# Patient Record
Sex: FEMALE | Race: WHITE | NOT HISPANIC OR LATINO | Employment: FULL TIME | URBAN - METROPOLITAN AREA
[De-identification: names, ages, dates, MRNs, and addresses within clinical notes are randomized per-mention and may not be internally consistent; named-entity substitution may affect disease eponyms.]

---

## 2018-04-18 ENCOUNTER — TRANSCRIBE ORDERS (OUTPATIENT)
Dept: ADMINISTRATIVE | Facility: HOSPITAL | Age: 43
End: 2018-04-18

## 2018-04-18 DIAGNOSIS — E34.0 CARCINOID SYNDROME (HCC): Primary | ICD-10-CM

## 2018-04-25 ENCOUNTER — HOSPITAL ENCOUNTER (OUTPATIENT)
Dept: RADIOLOGY | Facility: HOSPITAL | Age: 43
Discharge: HOME | End: 2018-04-25
Payer: COMMERCIAL

## 2018-04-25 DIAGNOSIS — E34.0 CARCINOID SYNDROME (HCC): ICD-10-CM

## 2018-04-25 PROCEDURE — 74177 CT ABD & PELVIS W/CONTRAST: CPT

## 2018-04-25 PROCEDURE — 71260 CT THORAX DX C+: CPT

## 2018-04-25 RX ADMIN — IOHEXOL 100 ML: 350 INJECTION, SOLUTION INTRAVENOUS at 10:19

## 2019-08-01 ENCOUNTER — ANNUAL EXAM (OUTPATIENT)
Dept: OBGYN CLINIC | Facility: CLINIC | Age: 44
End: 2019-08-01
Payer: COMMERCIAL

## 2019-08-01 VITALS
WEIGHT: 166 LBS | DIASTOLIC BLOOD PRESSURE: 80 MMHG | SYSTOLIC BLOOD PRESSURE: 130 MMHG | BODY MASS INDEX: 26.68 KG/M2 | HEIGHT: 66 IN

## 2019-08-01 DIAGNOSIS — Z12.39 BREAST SCREENING, UNSPECIFIED: Primary | ICD-10-CM

## 2019-08-01 DIAGNOSIS — Z01.419 ENCOUNTER FOR ANNUAL ROUTINE GYNECOLOGICAL EXAMINATION: ICD-10-CM

## 2019-08-01 PROCEDURE — 99396 PREV VISIT EST AGE 40-64: CPT | Performed by: OBSTETRICS & GYNECOLOGY

## 2019-08-01 RX ORDER — AMITRIPTYLINE HYDROCHLORIDE 50 MG/1
50 TABLET, FILM COATED ORAL DAILY
Refills: 5 | COMMUNITY
Start: 2019-06-17

## 2019-08-01 RX ORDER — SUMATRIPTAN 50 MG/1
TABLET, FILM COATED ORAL
Refills: 5 | COMMUNITY
Start: 2019-07-19

## 2019-08-01 NOTE — PROGRESS NOTES
The patient is here for a yearly  The patient is not due for a pap smear  The patient has that come every 24-30 days  The patient sometime has periods twice in one month- she would have a period at the beginning of the month and towards the end of the month  No vaginal or urinary issues  No breast concerns

## 2019-08-01 NOTE — PROGRESS NOTES
Patient presents for an annual well visit  Her only complaint is that she has gained 20 lb a year for the last 2 years  She is followed by endocrine and has and all normal testing including thyroid  She has this continued flushing and has been thoroughly worked up with no diagnosis  I spent time going over her diet  She does not eat breakfast on a regular basis  I emphasized the importance of eating a high protein breakfast along with carbs in the form of fresh fruits  She seems to understand this concept and is willing to try  Gyn history   x1    x 1  Maternal grandmother developed ovarian cancer at age 79    Screening colonoscopy at age 38 was discussed  Medications were reviewed  Review of systems  No bowel bladder issues  No heart or chest issues  No pelvic pain regular periods  Physical exam   HEENT normal   Chest clear bilateral breath sounds  Heart regular rate no murmurs  Breasts no masses no skin changes no nipple discharge no axillary nodes  Abdomen soft nontender  Bowel sounds present  Extremities within normal limits  Pelvic examination  External genitalia normal   [place] glands normal   Urethra normal   Vagina clear no lesions  No cystocele rectocele uterine prolapse noted  Cervix no lesions  Uterus normal size mobile nontender  No adnexal masses or tenderness bilaterally  Rectal exam no fissures or external hemorrhoids  Impression normal breast and gyn exam     Plan Rx for mammogram given  Return to office in 1 year for annual exam   Observe menstrual periods  Consider colonoscopy in the next year or so

## 2020-06-17 ENCOUNTER — TRANSCRIBE ORDERS (OUTPATIENT)
Dept: ADMINISTRATIVE | Facility: HOSPITAL | Age: 45
End: 2020-06-17

## 2020-06-17 DIAGNOSIS — E04.9 ENLARGED THYROID: Primary | ICD-10-CM

## 2020-06-18 ENCOUNTER — HOSPITAL ENCOUNTER (OUTPATIENT)
Dept: RADIOLOGY | Facility: MEDICAL CENTER | Age: 45
Discharge: HOME | End: 2020-06-18
Payer: COMMERCIAL

## 2020-06-18 DIAGNOSIS — E04.9 ENLARGED THYROID: ICD-10-CM

## 2020-06-18 PROCEDURE — 76536 US EXAM OF HEAD AND NECK: CPT

## 2020-08-05 ENCOUNTER — ANNUAL EXAM (OUTPATIENT)
Dept: OBGYN CLINIC | Facility: CLINIC | Age: 45
End: 2020-08-05
Payer: COMMERCIAL

## 2020-08-05 VITALS
HEIGHT: 64 IN | BODY MASS INDEX: 27.83 KG/M2 | SYSTOLIC BLOOD PRESSURE: 122 MMHG | DIASTOLIC BLOOD PRESSURE: 80 MMHG | WEIGHT: 163 LBS

## 2020-08-05 DIAGNOSIS — Z01.419 ENCOUNTER FOR ANNUAL ROUTINE GYNECOLOGICAL EXAMINATION: ICD-10-CM

## 2020-08-05 DIAGNOSIS — Z12.31 ENCOUNTER FOR SCREENING MAMMOGRAM FOR BREAST CANCER: Primary | ICD-10-CM

## 2020-08-05 PROCEDURE — 99386 PREV VISIT NEW AGE 40-64: CPT | Performed by: OBSTETRICS & GYNECOLOGY

## 2020-08-05 NOTE — PROGRESS NOTES
The patient is here for a yearly  The patient is not due for a pap smear  Pap normal HPV neg 7/30/18, pap normal 9/10/16, pap normal 7/11/12  The patient has regular periods  She has her periods every 24-28 days  No vaginal, bowel, bladder or breast issues

## 2020-08-05 NOTE — PROGRESS NOTES
Assessment/Plan:  Normal breast and gyn exam    Plan:  Rx mammogram   Encouraged healthy diet and exercise  Add a multivitamin  Recommend flu vaccine    Subjective:      Patient ID: Mila Weaver is a 54 y  o  female presents for yearly exam with no complaints  Patient denies any breast problems, bowel or bladder issues  Menses are regular with no pelvic pain  No change in family history  Medications reviewed  Review of Systems   Constitutional: Negative  HENT: Negative  Eyes: Negative  Respiratory: Negative  Cardiovascular: Negative  Gastrointestinal: Negative  Endocrine: Negative  Musculoskeletal: Negative  Skin: Negative  Allergic/Immunologic: Negative  Neurological: Negative  Hematological: Negative  Psychiatric/Behavioral: Negative  All other systems reviewed and are negative  Objective:      /80 (BP Location: Left arm, Patient Position: Sitting, Cuff Size: Standard)   Ht 5' 4"   Wt 73 9 kg (163 lb)   LMP 2020 (Exact Date)   BMI 27 98 kg/m²          Physical Exam   Constitutional: She is oriented to person, place, and time  She appears well-developed  HENT:   Head: Normocephalic and atraumatic  Neck: Normal range of motion  Neck supple  No tracheal deviation present  No thyromegaly present  Cardiovascular: Normal rate, regular rhythm and normal heart sounds  Pulmonary/Chest: Effort normal and breath sounds normal  No stridor  No respiratory distress  She has no wheezes  She has no rales  She exhibits no tenderness  Right breast exhibits no inverted nipple, no mass, no nipple discharge, no skin change and no tenderness  Left breast exhibits no inverted nipple, no mass, no nipple discharge, no skin change and no tenderness  Breasts are symmetrical    Abdominal: Soft  Bowel sounds are normal  She exhibits no distension and no mass  There is no abdominal tenderness  There is no rebound and no guarding  No hernia   Hernia confirmed negative in the left inguinal area  Genitourinary:    Vagina normal    Rectum:      No rectal mass, anal fissure, external hemorrhoid or internal hemorrhoid  There is no rash, tenderness, lesion or injury on the right labia  There is no rash, tenderness, lesion or injury on the left labia  Uterus is not deviated, not enlarged, not fixed and not tender  Cervix exhibits no motion tenderness, no discharge and no friability  Right adnexum displays no mass, no tenderness and no fullness  Left adnexum displays no mass, no tenderness and no fullness  No vaginal discharge, erythema, tenderness or bleeding  No erythema, tenderness or bleeding in the vagina  No foreign body in the vagina  No signs of injury in the vagina  Lymphadenopathy: No inguinal adenopathy noted on the right or left side  Neurological: She is alert and oriented to person, place, and time  Skin: Skin is warm and dry     Psychiatric: Her behavior is normal  Judgment and thought content normal

## 2021-05-04 ENCOUNTER — TELEPHONE (OUTPATIENT)
Dept: OBGYN CLINIC | Facility: CLINIC | Age: 46
End: 2021-05-04

## 2021-05-04 NOTE — TELEPHONE ENCOUNTER
Pt called stating that her period was 3 days early one month than another month it was 32 days before she got it now she has her period and it is has been 14 days  It was heavy then went to spotting and now heavy again with clots  She wants to know could she be starting menopause and is there any way to tell this?

## 2023-08-28 ENCOUNTER — OFFICE VISIT (OUTPATIENT)
Dept: OBGYN CLINIC | Facility: CLINIC | Age: 48
End: 2023-08-28

## 2023-08-28 ENCOUNTER — APPOINTMENT (OUTPATIENT)
Dept: RADIOLOGY | Facility: AMBULARY SURGERY CENTER | Age: 48
End: 2023-08-28
Attending: ORTHOPAEDIC SURGERY
Payer: COMMERCIAL

## 2023-08-28 VITALS
HEART RATE: 74 BPM | WEIGHT: 163 LBS | HEIGHT: 64 IN | DIASTOLIC BLOOD PRESSURE: 84 MMHG | SYSTOLIC BLOOD PRESSURE: 136 MMHG | BODY MASS INDEX: 27.83 KG/M2

## 2023-08-28 DIAGNOSIS — M25.551 RIGHT HIP PAIN: ICD-10-CM

## 2023-08-28 DIAGNOSIS — M54.16 RIGHT LUMBAR RADICULITIS: ICD-10-CM

## 2023-08-28 DIAGNOSIS — M54.50 LOW BACK PAIN, UNSPECIFIED BACK PAIN LATERALITY, UNSPECIFIED CHRONICITY, UNSPECIFIED WHETHER SCIATICA PRESENT: ICD-10-CM

## 2023-08-28 DIAGNOSIS — M43.16 SPONDYLOLISTHESIS, LUMBAR REGION: ICD-10-CM

## 2023-08-28 DIAGNOSIS — M70.61 GREATER TROCHANTERIC BURSITIS OF RIGHT HIP: Primary | ICD-10-CM

## 2023-08-28 DIAGNOSIS — M47.816 FACET ARTHROPATHY, LUMBAR: ICD-10-CM

## 2023-08-28 PROCEDURE — 73502 X-RAY EXAM HIP UNI 2-3 VIEWS: CPT

## 2023-08-28 PROCEDURE — 72110 X-RAY EXAM L-2 SPINE 4/>VWS: CPT

## 2023-08-28 RX ORDER — BETAMETHASONE SODIUM PHOSPHATE AND BETAMETHASONE ACETATE 3; 3 MG/ML; MG/ML
12 INJECTION, SUSPENSION INTRA-ARTICULAR; INTRALESIONAL; INTRAMUSCULAR; SOFT TISSUE
Status: COMPLETED | OUTPATIENT
Start: 2023-08-28 | End: 2023-08-28

## 2023-08-28 RX ORDER — LIDOCAINE HYDROCHLORIDE 10 MG/ML
1 INJECTION, SOLUTION INFILTRATION; PERINEURAL
Status: COMPLETED | OUTPATIENT
Start: 2023-08-28 | End: 2023-08-28

## 2023-08-28 RX ORDER — PROPRANOLOL HYDROCHLORIDE 10 MG/1
TABLET ORAL
COMMUNITY
Start: 2022-08-24

## 2023-08-28 RX ORDER — BUPIVACAINE HYDROCHLORIDE 2.5 MG/ML
1 INJECTION, SOLUTION INFILTRATION; PERINEURAL
Status: COMPLETED | OUTPATIENT
Start: 2023-08-28 | End: 2023-08-28

## 2023-08-28 RX ORDER — SUMATRIPTAN 50 MG/1
TABLET, FILM COATED ORAL
COMMUNITY
Start: 2013-08-29

## 2023-08-28 RX ORDER — OMEPRAZOLE 20 MG/1
CAPSULE, DELAYED RELEASE ORAL
COMMUNITY
Start: 2023-08-16

## 2023-08-28 RX ADMIN — LIDOCAINE HYDROCHLORIDE 1 ML: 10 INJECTION, SOLUTION INFILTRATION; PERINEURAL at 11:30

## 2023-08-28 RX ADMIN — BUPIVACAINE HYDROCHLORIDE 1 ML: 2.5 INJECTION, SOLUTION INFILTRATION; PERINEURAL at 11:30

## 2023-08-28 RX ADMIN — BETAMETHASONE SODIUM PHOSPHATE AND BETAMETHASONE ACETATE 12 MG: 3; 3 INJECTION, SUSPENSION INTRA-ARTICULAR; INTRALESIONAL; INTRAMUSCULAR; SOFT TISSUE at 11:30

## 2023-08-28 NOTE — PROGRESS NOTES
Assessment:  1. Greater trochanteric bursitis of right hip  Large joint arthrocentesis: R greater trochanteric bursa    Ambulatory Referral to Physical Therapy    Ambulatory referral to Spine & Pain Management      2. Right hip pain  XR hip/pelv 2-3 vws right if performed    Ambulatory Referral to Physical Therapy      3. Low back pain, unspecified back pain laterality, unspecified chronicity, unspecified whether sciatica present  XR spine lumbar minimum 4 views non injury    CANCELED: XR spine lumbar 2 or 3 views injury      4. Right lumbar radiculitis  Ambulatory Referral to Physical Therapy    Ambulatory referral to Spine & Pain Management      5. Spondylolisthesis, lumbar region  Ambulatory Referral to Physical Therapy    Ambulatory referral to Spine & Pain Management      6. Facet arthropathy, lumbar  Ambulatory Referral to Physical Therapy    Ambulatory referral to Spine & Pain Management        Patient Active Problem List   Diagnosis   • Right lumbar radiculitis   • Greater trochanteric bursitis of right hip       Plan:    51 y.o. female with right hip pain due to greater troch bursitis likely related to imbalances and scoliosis with degenerative changes in the lumbar spine    • Corticosteroid injection was offered, accepted, and administered into the right greater troch. Risk benefits and alternatives were discussed prior to injection. Patient tolerated the procedure well. • PT referral  • Spine and pain referral  • Follow up prn      The patient was seen and examined by Dr. Khari Zimmerman and myself. The assessment and plan were formulated by Dr. Khari Zimmerman and I assisted in carrying it out. Subjective:   Patient ID: Chris Bernal is a 51 y.o. female . HPI    Patient comes in today with regards to right hip pain. Patient is referred to us by Kt Higuera MD for further evaluation. The patient reports that the pain been going on for 1 year. Patient rates their pain as .  Injury or trauma prior to onset of pain: none.  Pain is located in the lateral hip buttocks. It is worsened with sitting to standing, worse in morning, and is made better with nothing. Treatments tried: nsaids, chiropractor, no PT or CSI . The pain does radiate down the leg. Old injuries or prior surgeries: none. Numbness or tingling: down to foot . The following portions of the patient's history were reviewed and updated as appropriate: allergies, current medications, past family history, past social history, past surgical history and problem list.    Social History     Socioeconomic History   • Marital status: /Civil Union     Spouse name: Not on file   • Number of children: Not on file   • Years of education: Not on file   • Highest education level: Not on file   Occupational History   • Not on file   Tobacco Use   • Smoking status: Never   • Smokeless tobacco: Never   Substance and Sexual Activity   • Alcohol use: Yes   • Drug use: Not on file   • Sexual activity: Yes     Birth control/protection: Condom Male   Other Topics Concern   • Not on file   Social History Narrative   • Not on file     Social Determinants of Health     Financial Resource Strain: Not on file   Food Insecurity: Not on file   Transportation Needs: Not on file   Physical Activity: Not on file   Stress: Not on file   Social Connections: Not on file   Intimate Partner Violence: Not on file   Housing Stability: Not on file     History reviewed. No pertinent past medical history.   Past Surgical History:   Procedure Laterality Date   •  SECTION       Allergies   Allergen Reactions   • Latex      Current Outpatient Medications on File Prior to Visit   Medication Sig Dispense Refill   • propranolol (INDERAL) 10 mg tablet      • SUMAtriptan (IMITREX) 50 mg tablet      • amitriptyline (ELAVIL) 50 mg tablet Take 50 mg by mouth daily  5   • Aspirin-Acetaminophen-Caffeine (EXCEDRIN MIGRAINE PO) Take 1 tablet by mouth daily     • omeprazole (PriLOSEC) 20 mg delayed release capsule      • SUMAtriptan (IMITREX) 50 mg tablet TAKE 1 TABLET AS NEEDED FOR MIGRAINE-MAY REPEAT AFTER 2 HOURS (MAX 2 PER 24 HOURS)  5     No current facility-administered medications on file prior to visit. Review of Systems   Constitutional: Negative. HENT: Negative. Eyes: Negative. Respiratory: Negative. Cardiovascular: Negative. Gastrointestinal: Negative. Endocrine: Negative. Genitourinary: Negative. Skin: Negative. Allergic/Immunologic: Negative. Neurological: Negative. Hematological: Negative. Psychiatric/Behavioral: Negative. Objective:    Vitals:    08/28/23 1127   BP: 136/84   Pulse: 74       Physical Exam    Left Hip Exam     Range of Motion   External rotation: normal   Internal rotation: normal     Tests   LUIZ: negative  Sunday: negative    Other   Erythema: absent  Scars: absent  Pulse: present    Comments:  + SLR , negative stinchfield,     Strength 5/5 L2-S1 bilat    Negative standing hip abduction test            I have personally reviewed pertinent films in PACS and my interpretation is XR lumbar spine and R hip done today. R hip shows no signficiant DJD, no acute fracture. XR lumbar spine shows facet arthropathy, grade 1 anterolisthesis L4 on L5, DDD worst at L5-S1. Large joint arthrocentesis: R greater trochanteric bursa  Universal Protocol:  Consent given by: patient  Time out: Immediately prior to procedure a "time out" was called to verify the correct patient, procedure, equipment, support staff and site/side marked as required.   Site marked: the operative site was marked  Supporting Documentation  Indications: pain   Procedure Details  Location: hip - R greater trochanteric bursa  Preparation: Patient was prepped and draped in the usual sterile fashion  Needle size: 22 G  Medications administered: 1 mL bupivacaine 0.25 %; 12 mg betamethasone acetate-betamethasone sodium phosphate 6 (3-3) mg/mL; 1 mL lidocaine 1 %    Patient tolerance: patient tolerated the procedure well with no immediate complications  Dressing:  Sterile dressing applied              Scribe Attestation    I,:  Viraj Anderson PA-C am acting as a scribe while in the presence of the attending physician.:       I,:  Jenn Snyder, DO personally performed the services described in this documentation    as scribed in my presence.:             Portions of the record may have been created with voice recognition software. Occasional wrong word or "sound a like" substitutions may have occurred due to the inherent limitations of voice recognition software. Read the chart carefully and recognize, using context, where substitutions have occurred.

## 2023-09-07 ENCOUNTER — EVALUATION (OUTPATIENT)
Dept: PHYSICAL THERAPY | Facility: MEDICAL CENTER | Age: 48
End: 2023-09-07

## 2023-09-07 DIAGNOSIS — M54.16 RIGHT LUMBAR RADICULITIS: ICD-10-CM

## 2023-09-07 DIAGNOSIS — M47.816 FACET ARTHROPATHY, LUMBAR: ICD-10-CM

## 2023-09-07 DIAGNOSIS — M70.61 GREATER TROCHANTERIC BURSITIS OF RIGHT HIP: ICD-10-CM

## 2023-09-07 DIAGNOSIS — M43.16 SPONDYLOLISTHESIS, LUMBAR REGION: ICD-10-CM

## 2023-09-07 DIAGNOSIS — M25.551 RIGHT HIP PAIN: ICD-10-CM

## 2023-09-07 PROCEDURE — 97161 PT EVAL LOW COMPLEX 20 MIN: CPT | Performed by: PHYSICAL THERAPIST

## 2023-09-07 NOTE — PROGRESS NOTES
PT Evaluation     Today's date: 2023  Patient name: Taz Cool  : 1975  MRN: [id number]  Referring provider: Ephraim Haines  Dx:   Encounter Diagnosis     ICD-10-CM    1. Right hip pain  M25.551 Ambulatory Referral to Physical Therapy      2. Right lumbar radiculitis  M54.16 Ambulatory Referral to Physical Therapy      3. Greater trochanteric bursitis of right hip  M70.61 Ambulatory Referral to Physical Therapy      4. Spondylolisthesis, lumbar region  M43.16 Ambulatory Referral to Physical Therapy      5. Facet arthropathy, lumbar  M47.816 Ambulatory Referral to Physical Therapy          Start Time:   Stop Time: 1015  Total time in clinic (min): 37 minutes    Assessment  Assessment details: Pt is a 51 y.o. female who presents to OP PT with increased R hip and low back pain, decreased LE strength and decreased activity tolerance. These impairments limit the patient from participating in work related duties as a , decreased tolerance to prolonged sitting and decreased tolerance to bending such as when dressing. Screen of lumbar spine was completed with repeated motions testing with pt preference to extension ROM. Repeated lumbar extension in prone with exhale decreased pain from initial 7/10 in R hip to 2-3/10. Pt was educated about symptoms at present and plan for PT moving forward. HEP provided and patient was educated about frequency and what to expect. Pt was educated about adverse symptoms and when to DC exercise as well. All other questions and concerns were addressed. I believe this patient is a good candidate for and will benefit from skilled physical therapy for manual therapy to the R hip and low back, ROM exercises, strengthening exercises, core stability exercises and mechanics training to improve limitations and assist the patient to return to OF. Thank you for the opportunity to participate in [address] [place], [place] [address] care.     Positive Prognostic Indicators: desire to improve    Negative Prognostic Indicators: chronicity of symptoms    Impairments: abnormal gait, abnormal or restricted ROM, abnormal movement, activity intolerance, impaired physical strength, lacks appropriate home exercise program and pain with function    Symptom irritability: lowUnderstanding of Dx/Px/POC: good   Prognosis: good    Goals  STGs: 4 weeks  1) Pt will have SPR decrease of 2 units at worst  2) pt will have improved global LE strength to 5/5  3) pt will have improved foto score of 10 points    LTGs: 8 weeks  1) pt will be independent with HEP by D/C  2) pt will be independent with symptom management by D/C  3) pt will subjectively report improved tolerance to driving by at least 67% in order to demonstrate improved activity tolerance by DC. Plan  Patient would benefit from: skilled physical therapy  Planned modality interventions: cryotherapy and thermotherapy: hydrocollator packs  Planned therapy interventions: IASTM, joint mobilization, manual therapy, neuromuscular re-education, patient education, postural training, strengthening, stretching, therapeutic activities, therapeutic exercise, home exercise program, gait training, functional ROM exercises and balance/weight bearing training  Frequency: 1x week  Duration in weeks: 12  Plan of Care beginning date: 9/7/2023  Plan of Care expiration date: 11/30/2023  Treatment plan discussed with: patient        Subjective Evaluation    History of Present Illness  Mechanism of injury: DOO: over a year  KELECHI: insidious      Subjective Comments: pt reports that she has R hip pain and reports that the facet joints in her back are squished. Pt reports that she got an injection in her hip with out relief. Pt reports dull pain in the R hip and goes into her leg. Pt reports the radiates down the side of her thigh. Pt reports that she has on and off tingling in to her toes, noted when she is driving.  She notes that this usually happens when she is in a sitting position. Standing relieves symptoms. Pt repots that laying down hurts her back and hip. She reports that she can only lay on her back. She avoids laying on her stomach, not that it is uncomfortable but she will have increased stiffness following. Denies previous back or hip injuries. Pain   Rest: 9/10   Best: 5/10   Worst: 10/10    Relieving Factors: standing, meds (advil/aleve), topical creams    Exacerbating Factors: laying on that side, driving, getting dressed, getting in and out of the amy. Sleeping: increased difficulty getting comfortable    Home Set-up: getting up from a chair is difficult, no issues with steps    ADLs: increased difficulty dressing first thing in the morning. increased pain when reaching down towards      Work/Hobbies: , went golfing with some back pain but overall did well. Previous Treatment: injection, has tried chiropractor    Goals:  Decrease pain, improve driving tolerance            Objective     Palpation   Left   Hypertonic in the lumbar paraspinals and quadratus lumborum. Right   Hypertonic in the lumbar paraspinals and quadratus lumborum.      Additional Palpation Details  TTP R piriformis     Neurological Testing     Sensation     Lumbar   Left   Intact: light touch    Right   Intact: light touch    Reflexes   Left   Patellar (L4): normal (2+)  Achilles (S1): normal (2+)    Right   Patellar (L4): normal (2+)  Achilles (S1): normal (2+)    Active Range of Motion     Lumbar   Flexion:  with pain Restriction level: minimal  Extension:  WFL  Left lateral flexion:  WFL  Right lateral flexion:  [facility]    Joint Play     Hypomobile: L1, L2, L3, L4, L5 and S1   Mechanical Assessment    Cervical      Thoracic      Lumbar    Standing flexion: repeated movements   Pain location:peripheralized  Pain intensity: worse  Pain level: increased  Standing extension: repeated movements  Pain location: centralized  Lying extension: repeated movements  Pain location: centralized  Pain intensity: better  Pain level: decreased    Strength/Myotome Testing     Left Hip   Planes of Motion   Flexion: 5  Abduction: 4+  Adduction: 4+    Right Hip   Planes of Motion   Flexion: 4+  Abduction: 4+  Adduction: 4+    Left Knee   Flexion: 5  Extension: 5    Right Knee   Flexion: 5  Extension: 5    Left Ankle/Foot   Dorsiflexion: 5  Plantar flexion: 5  Great toe extension: 5    Right Ankle/Foot   Dorsiflexion: 5  Plantar flexion: 5  Great toe extension: 5    Tests     Lumbar     Left   Negative passive SLR and slump test.     Right   Positive slump test.   Negative passive SLR.               Precautions: Hx LBP      Manuals 9/7            IASTM R hip                                                    Neuro Re-Ed             bridges             Supine hip abd             SLR                                                                 Ther Ex             bike             Side stepping             Mini squats             Step ups             Piriformis stretch 30"x3            PPU W/ exhale x20            Stranding lumbar ext x10                         Ther Activity                                       Gait Training                                       Modalities

## 2023-09-12 ENCOUNTER — OFFICE VISIT (OUTPATIENT)
Dept: PHYSICAL THERAPY | Facility: MEDICAL CENTER | Age: 48
End: 2023-09-12

## 2023-09-12 DIAGNOSIS — M47.816 FACET ARTHROPATHY, LUMBAR: ICD-10-CM

## 2023-09-12 DIAGNOSIS — M70.61 GREATER TROCHANTERIC BURSITIS OF RIGHT HIP: ICD-10-CM

## 2023-09-12 DIAGNOSIS — M54.16 RIGHT LUMBAR RADICULITIS: ICD-10-CM

## 2023-09-12 DIAGNOSIS — M43.16 SPONDYLOLISTHESIS, LUMBAR REGION: ICD-10-CM

## 2023-09-12 DIAGNOSIS — M25.551 RIGHT HIP PAIN: Primary | ICD-10-CM

## 2023-09-12 PROCEDURE — 97110 THERAPEUTIC EXERCISES: CPT | Performed by: PHYSICAL THERAPIST

## 2023-09-12 NOTE — PROGRESS NOTES
Daily Note     Today's date: 2023  Patient name: Moustapha Woody  : 1975  MRN: [id number]  Referring provider: Morelia Ballard  Dx:   Encounter Diagnosis     ICD-10-CM    1. Right hip pain  M25.551       2. Facet arthropathy, lumbar  M47.816       3. Right lumbar radiculitis  M54.16       4. Greater trochanteric bursitis of right hip  M70.61       5. Spondylolisthesis, lumbar region  M43.16           Start Time: 932  Stop Time: 1013  Total time in clinic (min): 41 minutes    Subjective: pt reports that she is getting relief of symptoms with the exercises, however this is not long lasting. Objective: See treatment diary below      Assessment: Tolerated treatment well. IASTM completed to the R hip with good tolerance. Pt was educated about purpose of IASTM, risk and benefits that may occur, and what to expect following. Core stability exercises were completed and tolerated well and added to HEP. Education on towel roll when sitting was completed with good tolerance. Pt was asked to complete this while sitting/driving in order to assess symptom change. Patient would benefit from continued PT      Plan: Continue per plan of care. Precautions: Hx LBP      Manuals            IASTM R hip  RK                                                  Neuro Re-Ed             bridges  x20           Supine hip abd  SL clamshell 2x10 ea. SLR  2x10 ea.                                                                 Ther Ex             bike  5'           Side stepping             Mini squats             Step ups             Piriformis stretch 30"x3 30"x3 low and high           PPU W/ exhale x20 W/ exhale x20           Stranding lumbar ext x10                         Ther Activity                                       Gait Training                                       Modalities

## 2023-09-20 ENCOUNTER — OFFICE VISIT (OUTPATIENT)
Dept: PHYSICAL THERAPY | Facility: MEDICAL CENTER | Age: 48
End: 2023-09-20

## 2023-09-20 DIAGNOSIS — M54.16 RIGHT LUMBAR RADICULITIS: ICD-10-CM

## 2023-09-20 DIAGNOSIS — M43.16 SPONDYLOLISTHESIS, LUMBAR REGION: ICD-10-CM

## 2023-09-20 DIAGNOSIS — M47.816 FACET ARTHROPATHY, LUMBAR: ICD-10-CM

## 2023-09-20 DIAGNOSIS — M70.61 GREATER TROCHANTERIC BURSITIS OF RIGHT HIP: ICD-10-CM

## 2023-09-20 DIAGNOSIS — M25.551 RIGHT HIP PAIN: Primary | ICD-10-CM

## 2023-09-20 PROCEDURE — 97110 THERAPEUTIC EXERCISES: CPT | Performed by: PHYSICAL THERAPIST

## 2023-09-20 NOTE — PROGRESS NOTES
Daily Note     Today's date: 2023  Patient name: Kalina King  : 1975  MRN: [id number]  Referring provider: Maricarmen Hamilton  Dx:   Encounter Diagnosis     ICD-10-CM    1. Right hip pain  M25.551       2. Facet arthropathy, lumbar  M47.816       3. Right lumbar radiculitis  M54.16       4. Greater trochanteric bursitis of right hip  M70.61       5. Spondylolisthesis, lumbar region  M43.16           Start Time: 932  Stop Time:   Total time in clinic (min): 43 minutes    Subjective: pt reports that she feels about the same. Pt reports that she is about a 7-8/10. She denies back pain but reports this as hip pain. She continues to reports complete relief when laying on stomach. She reports she has not completed HEP in a couple days. Objective: See treatment diary below    Continued limited lumbar flexion due to R hip pain. (minimal)  Decreased R hip strength flexion 4+/5, abduction 3+/5  + slump test  - SLR  Symptoms relieved with prone lying and prone extension and standing lumbar extension      Assessment: Tolerated treatment well. Pt continues to demonstrate familiar symptoms and similar measurements form IE (today was visit 3). Pt notes improvement in lumbar extension ROM however reports symptoms return within an hour. Pt reports that she could not use towel roll due to the locked position of the bus seat was too uncomfortable. Pt continues to have increased lateral hip pain with lumbar flexion. Pt continues to demonstrate hip weakness. ROM is full. Pt was educated about lumbar extension exercises (along with X-ray findings) and how these can effect symptoms. Pt was educated about lumabr extension exercises and its effect on spondylolisthesis however, this is providing significant relief for a moment as well. Pt was educated to continue these as needed, however greater focus will be made on hip and core strengthening.   Pt was also educated to hold on these exercises if they are causing increased back or hip pain. Exercises completed with good tolerance and performance. HEP updated. We will continue to progress as tolerated. Patient would benefit from continued PT      Plan: Continue per plan of care. Precautions: Hx LBP    Pt 1:1 from 932-1010  Manuals 9/7 9/12 9/20          IASTM R hip  RK RK                                                 Neuro Re-Ed             bridges  x20 x20          Supine hip abd  SL clamshell 2x10 ea. SL clamshell 2x10 ea. rtb          SLR  2x10 ea. 2x10 ea. TAB+ alt arms    Legs 90/90 x10 ea. TAB + alt legs   arms 90 x10 ea. planks   30"x1          paloff press   rtb 5" x10 ea.           Ther Ex             bike  5' 5'          Side stepping             Mini squats             Step ups             Piriformis stretch 30"x3 30"x3 low and high 30"x3 low and high          PPU W/ exhale x20 W/ exhale x20 W/ exhale x10          Stranding lumbar ext x10                         Ther Activity                                       Gait Training                                       Modalities

## 2023-09-27 ENCOUNTER — OFFICE VISIT (OUTPATIENT)
Dept: PHYSICAL THERAPY | Facility: MEDICAL CENTER | Age: 48
End: 2023-09-27

## 2023-09-27 DIAGNOSIS — M70.61 GREATER TROCHANTERIC BURSITIS OF RIGHT HIP: ICD-10-CM

## 2023-09-27 DIAGNOSIS — M43.16 SPONDYLOLISTHESIS, LUMBAR REGION: ICD-10-CM

## 2023-09-27 DIAGNOSIS — M47.816 FACET ARTHROPATHY, LUMBAR: ICD-10-CM

## 2023-09-27 DIAGNOSIS — M25.551 RIGHT HIP PAIN: Primary | ICD-10-CM

## 2023-09-27 DIAGNOSIS — M54.16 RIGHT LUMBAR RADICULITIS: ICD-10-CM

## 2023-09-27 PROCEDURE — 97140 MANUAL THERAPY 1/> REGIONS: CPT | Performed by: PHYSICAL THERAPIST

## 2023-09-27 PROCEDURE — 97112 NEUROMUSCULAR REEDUCATION: CPT | Performed by: PHYSICAL THERAPIST

## 2023-09-27 NOTE — PROGRESS NOTES
Daily Note     Today's date: 2023  Patient name: Fareed Quinones  : 1975  MRN: [id number]  Referring provider: Silverio Kirkpatrick  Dx:   Encounter Diagnosis     ICD-10-CM    1. Right hip pain  M25.551       2. Spondylolisthesis, lumbar region  M43.16       3. Facet arthropathy, lumbar  M47.816       4. Right lumbar radiculitis  M54.16       5. Greater trochanteric bursitis of right hip  M70.61           Start Time: 928  Stop Time: 100  Total time in clinic (min): 33 minutes    Subjective: pt reports that her back/hip symptoms have improved since LV. She reports that she feels core stability exercises are helpful. She also notes that she is attempting to focus on bracing her core when she is driving such as with turns and bumps with improvement. She reports that she feels comfortable making today her LV. Objective: See treatment diary below      Assessment: Tolerated treatment well. IASTM completed to the R hip with good tolerance. Continued tightness in the glute med musculature was noted. Core stability and LE strengthening exercises were continued with good form and tolerance. Pt was educated about HEP and which exercises to continue to complete in order to continue to improve. pt was educated about lumbar extension exercises, how this affects spondylitises and frequency and intensity to complete exercises. Pt reports compliance and confidence with HEP and symptom management at this time. All other questions and concerns were addressed. At this time, pt will be DC from PT. Pt was educated to contact PT with any questions or concerns in the future. Patient would benefit from continued PT      Plan: Continue per plan of care. Precautions: Hx LBP    Pt 1:1 from   Manuals          IASTM R hip  RK RK RK                                                Neuro Re-Ed             bridges  x20 x20 x20         Supine hip abd  SL clamshell 2x10 ea. SL clamshell 2x10 ea. rtb SL clamshell 2x10 ea. rtb         SLR  2x10 ea. 2x10 ea. 2x10 ea. TAB+ alt arms    Legs 90/90 x10 ea. +Legs 90/90 x20 ea. TAB + alt legs   arms 90 x10 ea. planks   30"x1 30"x2         paloff press   rtb 5" x10 ea. rtb 5" x10 ea.          Ther Ex             bike  5' 5' np         Side stepping             Mini squats             Step ups             Piriformis stretch 30"x3 30"x3 low and high 30"x3 low and high 30"x3 ea. high         PPU W/ exhale x20 W/ exhale x20 W/ exhale x10          Stranding lumbar ext x10                         Ther Activity                                       Gait Training                                       Modalities

## 2024-04-30 ENCOUNTER — TELEPHONE (OUTPATIENT)
Dept: OBGYN CLINIC | Facility: HOSPITAL | Age: 49
End: 2024-04-30

## 2024-04-30 NOTE — TELEPHONE ENCOUNTER
Caller: Urvashi    Doctor/Office: [facility]    Call regarding :  Patient has done PT ordered by Dr. Delgado, he also referred her to [facility] which she never did. PT has not helped, I was able to transfer her to [facility].      Call was transferred to: [facility]

## 2024-05-07 ENCOUNTER — OFFICE VISIT (OUTPATIENT)
Dept: PAIN MEDICINE | Facility: CLINIC | Age: 49
End: 2024-05-07
Payer: COMMERCIAL

## 2024-05-07 VITALS
WEIGHT: 163 LBS | BODY MASS INDEX: 27.83 KG/M2 | HEIGHT: 64 IN | SYSTOLIC BLOOD PRESSURE: 167 MMHG | DIASTOLIC BLOOD PRESSURE: 104 MMHG | HEART RATE: 88 BPM

## 2024-05-07 DIAGNOSIS — M47.816 LUMBAR SPONDYLOSIS: ICD-10-CM

## 2024-05-07 DIAGNOSIS — M51.16 LUMBAR DISC DISEASE WITH RADICULOPATHY: Primary | ICD-10-CM

## 2024-05-07 DIAGNOSIS — M43.16 ANTEROLISTHESIS OF LUMBAR SPINE: ICD-10-CM

## 2024-05-07 PROCEDURE — 99204 OFFICE O/P NEW MOD 45 MIN: CPT | Performed by: PAIN MEDICINE

## 2024-05-07 RX ORDER — GABAPENTIN 300 MG/1
CAPSULE ORAL
Qty: 60 CAPSULE | Refills: 1 | Status: SHIPPED | OUTPATIENT
Start: 2024-05-07

## 2024-05-07 RX ORDER — METHOCARBAMOL 500 MG/1
500 TABLET, FILM COATED ORAL 3 TIMES DAILY
Qty: 90 TABLET | Refills: 1 | Status: SHIPPED | OUTPATIENT
Start: 2024-05-07

## 2024-05-07 NOTE — PROGRESS NOTES
Assessment  1. Lumbar disc disease with radiculopathy  -     MRI lumbar spine wo contrast; Future; Expected date: 2024  -     gabapentin (NEURONTIN) 300 mg capsule; 1-2 tablets at night for nerve pain  -     methocarbamol (ROBAXIN) 500 mg tablet; Take 1 tablet (500 mg total) by mouth 3 (three) times a day    2. Lumbar spondylosis    3. Anterolisthesis of lumbar spine           is here for:    Right sided lumbar radicular pain in the L5 dermatomal distribution accompanied by pain limited weakness numbness and paresthesias, grade 1 listhesis without dyanmic shift at L4-5, no benefit with NSAIDS for 3 weeks,    Lifestyle modifications extensively discussed including diet, exercise and weight loss in addition to core strengthening.  Will proceed with multimodal pain therapy plan as noted below: She has failed conservative therapy 6 weeks in the last 6 months with PT and chiro care    Trials of at least 3 weeks of NSAIDS: yes, no benefit  Anticoagulation: none  Imaging: MRI L spine  Procedure: defer  Medications: gabapentin 300 1-2 tabs qhs, methocarbomol 500 mg tid, discussed risks/benefits/alternatives regarding any new medications started at todays visit          My impressions and treatment recommendations were discussed in detail with the patient who verbalized understanding and had no further questions.  Discharge instructions were provided. I personally saw and examined the patient and I agree with the above discussed plan of care.    Plan      New Medications Ordered This Visit   Medications   • gabapentin (NEURONTIN) 300 mg capsule     Si-2 tablets at night for nerve pain     Dispense:  60 capsule     Refill:  1   • methocarbamol (ROBAXIN) 500 mg tablet     Sig: Take 1 tablet (500 mg total) by mouth 3 (three) times a day     Dispense:  90 tablet     Refill:  1       Orders Placed This Encounter   Procedures   • MRI lumbar spine wo contrast     Standing Status:   Future     Standing  Expiration Date:   5/7/2028     Scheduling Instructions:      There is no preparation for this test. Please leave your jewelry and valuables at home, wedding rings are the exception. All patients will be required to change into a hospital gown and pants.  Street clothes are not permitted in the MRI.  Magnetic nail polish must be removed prior to arrival for your test. Please bring your insurance cards, a form of photo ID and a list of your medications with you. Arrive 15 minutes prior to your appointment time in order to register. Please bring any prior CT or MRI studies of this area that were not performed at a [facility] facility.            To schedule this appointment, please contact Central Scheduling at [phone].            Prior to your appointment, please make sure you complete the MRI Screening Form when you e-Check in for your appointment. This will be available starting 7 days before your appointment in [employer]. You may receive an e-mail with an activation code if you do not have a [employer] account. If you do not have access to a device, we will complete your screening at your appointment.     Order Specific Question:   What is the patient's sedation requirement?     Answer:   No Sedation     Order Specific Question:   Does the patient have metallic implants?     Answer:   No     Order Specific Question:   Does this procedure require the 3T MRI at [place] or [place]?     Answer:   No     Order Specific Question:   Release to patient through [employer]     Answer:   Immediate     Order Specific Question:   Is order priority selected as STAT?     Answer:   No     Order Specific Question:   Reason for Exam     Answer:   Lumbar radicular pain     Order Specific Question:   Is the patient pregnant?     Answer:   No       History of Present Illness  Urvashi Rodarte is a 48 y.o. female with relevant PMH of possible HTN    Presenting to  [facility] for chief complaint of low back pain and right leg pain,   referred by   Symptoms have been present for several years, increasing in the past couple weeks, pain is always constant and include symptoms were gradual, numbness and tingling in the right leg. Quality of pain: moderate to severe, dull aching in the lo wback and burning sensation in the L5 dermatomal pattern .  Symptoms are worst: worse in the morning, sitting increases, Alleviating factors identifiable by patient are: walking was effective On a scale of 1-10, pain typically increases to 7/10, currently impacting quality of life      Conservative therapy (PT/chiro/accupuncture): Recent PT in the last 6 months for 6 weeks, and chiro care  Previous treatments: cortisone shot in the right hip  Prior surgeries of the spine: not of spine  Bowel/bladder incontinence or saddle anesthesia: morning incontinence  Relevant imaging: x ray L spine  Anticoagulants: none  Contrast allergy: none    I have personally reviewed and/or updated the patient's past medical history, past surgical history, family history, social history, current medications, allergies, and vital signs today.     Review of Systems   Musculoskeletal:  Positive for arthralgias, joint swelling and myalgias.   All other systems reviewed and are negative.      Patient Active Problem List   Diagnosis   • Right lumbar radiculitis   • Greater trochanteric bursitis of right hip       No past medical history on file.    Past Surgical History:   Procedure Laterality Date   •  SECTION         Family History   Problem Relation Age of Onset   • Ovarian cancer Maternal Grandmother        Social History     Occupational History   • Not on file   Tobacco Use   • Smoking status: Never   • Smokeless tobacco: Never   Substance and Sexual Activity   • Alcohol use: Yes   • Drug use: Not on file   • Sexual activity: Yes     Birth control/protection: Condom Male       Current Outpatient Medications on File Prior to Visit   Medication Sig   • amitriptyline  "(ELAVIL) 50 mg tablet Take 50 mg by mouth daily   • Aspirin-Acetaminophen-Caffeine (EXCEDRIN MIGRAINE PO) Take 1 tablet by mouth daily   • omeprazole (PriLOSEC) 20 mg delayed release capsule    • propranolol (INDERAL) 10 mg tablet    • SUMAtriptan (IMITREX) 50 mg tablet TAKE 1 TABLET AS NEEDED FOR MIGRAINE-MAY REPEAT AFTER 2 HOURS (MAX 2 PER 24 HOURS)   • SUMAtriptan (IMITREX) 50 mg tablet      No current facility-administered medications on file prior to visit.       Allergies   Allergen Reactions   • Latex          Physical Exam    BP (!) 167/104   Pulse 88   Ht 5' 4\" (1.626 m)   Wt 73.9 kg (163 lb)   BMI 27.98 kg/m²     Constitutional: normal, well developed, well nourished, alert, in no distress and non-toxic and no overt pain behavior.  Eyes: anicteric  HEENT: grossly intact  Neck: supple, symmetric, trachea midline and no masses   Pulmonary:even and unlabored  Cardiovascular:No edema or pitting edema present  Skin:Normal without rashes or lesions and well hydrated  Psychiatric:Mood and affect appropriate  Neurologic:Cranial Nerves II-XII grossly intact Sensation grossly intact; no clonus negative falcon's.      MSK:      Lumbar Spine:  No masses or atrophy,    Range of motion - Decreased extension/flexion  Palpation - Tenderness to palpation in the lumbar parapsinals   PSIS tenderness none  Joel's/LUIZ none  Gaenslen's non  SLR posittive on right        Strength Right Left   Hip flexion L1,2 5 5   Knee extension L3 5 5   Ankle dorsiflexion L4 5 5   Great toe extension L5 5 5   Ankle Plantarflexion S1 5 5       Sensory Exam:  intact to light touch bilateral LE       Reflexes:     Right Left   Biceps 2+ 2+   Triceps 2+ 2+   Brachioradialis 2+ 2+   Patellar 2+ 3+   Achilles 2+ 2+   Babinski neg neg        Gait normal                   Imaging      Xr Lspine  8/28/23  Mild scoliotic deformity is noted. Grade 1 anterolisthesis of L4 on L5.     Mild intervertebral disc space narrowing at L4-5. Facet " arthropathy at L4-5, L5-S1.     The pedicles appear intact. No instability on flexion/extension views.     Soft tissues are unremarkable.     IMPRESSION:     Mild degenerative changes of the lower lumbar spine.

## 2024-05-16 ENCOUNTER — HOSPITAL ENCOUNTER (OUTPATIENT)
Dept: MRI IMAGING | Facility: HOSPITAL | Age: 49
End: 2024-05-16
Attending: PAIN MEDICINE
Payer: COMMERCIAL

## 2024-05-16 DIAGNOSIS — M51.16 LUMBAR DISC DISEASE WITH RADICULOPATHY: ICD-10-CM

## 2024-05-16 PROCEDURE — 72148 MRI LUMBAR SPINE W/O DYE: CPT

## 2024-05-21 ENCOUNTER — OFFICE VISIT (OUTPATIENT)
Dept: PAIN MEDICINE | Facility: CLINIC | Age: 49
End: 2024-05-21
Payer: COMMERCIAL

## 2024-05-21 ENCOUNTER — TELEPHONE (OUTPATIENT)
Dept: NEUROLOGY | Facility: CLINIC | Age: 49
End: 2024-05-21

## 2024-05-21 VITALS
HEART RATE: 79 BPM | HEIGHT: 64 IN | WEIGHT: 163 LBS | BODY MASS INDEX: 27.83 KG/M2 | SYSTOLIC BLOOD PRESSURE: 133 MMHG | DIASTOLIC BLOOD PRESSURE: 87 MMHG

## 2024-05-21 DIAGNOSIS — M71.38 SYNOVIAL CYST OF LUMBAR FACET JOINT: Primary | ICD-10-CM

## 2024-05-21 DIAGNOSIS — M54.16 LUMBAR RADICULOPATHY: ICD-10-CM

## 2024-05-21 DIAGNOSIS — M48.061 SPINAL STENOSIS OF LUMBAR REGION WITHOUT NEUROGENIC CLAUDICATION: ICD-10-CM

## 2024-05-21 PROCEDURE — 99214 OFFICE O/P EST MOD 30 MIN: CPT | Performed by: PAIN MEDICINE

## 2024-05-21 RX ORDER — MELOXICAM 7.5 MG/1
7.5 TABLET ORAL 2 TIMES DAILY PRN
Qty: 60 TABLET | Refills: 0 | Status: SHIPPED | OUTPATIENT
Start: 2024-05-21

## 2024-05-21 NOTE — PROGRESS NOTES
Assessment  1. Synovial cyst of lumbar facet joint  2. Lumbar radiculopathy  -     meloxicam (MOBIC) 7.5 mg tablet; Take 1 tablet (7.5 mg total) by mouth 2 (two) times a day as needed for moderate pain  -     FL spine and pain procedure; Future; Expected date: 05/21/2024  3. Spinal stenosis of lumbar region without neurogenic claudication           is here for:    Right sided lumbar radicular pain in the L5 dermatomal distribution accompanied by pain limited weakness numbness and paresthesias, grade 1 listhesis without dyanmic shift at L4-5, no benefit with NSAIDS for 3 weeks,         MRI consistent with :  Degenerative spondylosis at L4-5 primarily due to facet arthropathy with moderate canal stenosis and 6 x 4 mm synovial cyst at arising from the right facet complex that effaces the right lateral recess and likely impinges on the traversing right L5 nerve   root. Smaller synovial cyst on the left with mild narrowing of the left lateral recess, consistent with patient's right leg pain       Lifestyle modifications extensively discussed including diet, exercise and weight loss in addition to core strengthening.  Will proceed with multimodal pain therapy plan as noted below: She has failed conservative therapy 6 weeks in the last 6 months with PT and chiro care    Trials of at least 3 weeks of NSAIDS: yes, no benefit  Anticoagulation: none  Imaging: MRI L spine reviewed in pacs with the patient  Procedure: schedule Right L4-5, 5-s1 TFESI, The risks of the procedure were discussed in detail.  These risks include infection, increased pain, paralysis, bleeding.  Patient understands the risks and is willing to pursue with the procedure    Medications: continue gabapentin 300 1-2 tabs qhs, methocarbomol 500 mg tid, discussed risks/benefits/alternatives regarding any new medications started at todays visit          My impressions and treatment recommendations were discussed in detail with the patient who  verbalized understanding and had no further questions.  Discharge instructions were provided. I personally saw and examined the patient and I agree with the above discussed plan of care.    Plan      New Medications Ordered This Visit   Medications   • meloxicam (MOBIC) 7.5 mg tablet     Sig: Take 1 tablet (7.5 mg total) by mouth 2 (two) times a day as needed for moderate pain     Dispense:  60 tablet     Refill:  0         Orders Placed This Encounter   Procedures   • FL spine and pain procedure     Standing Status:   Future     Standing Expiration Date:   5/21/2028     Order Specific Question:   Reason for Exam:     Answer:   Right L45, 5s1 TFESI     Order Specific Question:   Is the patient pregnant?     Answer:   No     Order Specific Question:   Anticoagulant hold needed?     Answer:   none         History of Present Illness  F/u 5/21/24  Urvashi is here to review her lumbar spine MRI she continues to have low back and right leg pain in the L5 dermatomal distribution.  She describes as a burning sensation symptoms are worse in the morning and sitting increases the pain alleviate by walking.  Her pain right now is a 7 out of 10.  In terms of the gabapentin it is helping her sleep, methocarbamol she is currently taking that she reports is not effective        Initial Consult  Urvashi Rodarte is a 48 y.o. female with relevant PMH of possible HTN    Presenting to  [facility] for chief complaint of low back pain and right leg pain,  referred by   Symptoms have been present for several years, increasing in the past couple weeks, pain is always constant and include symptoms were gradual, numbness and tingling in the right leg. Quality of pain: moderate to severe, dull aching in the lo wback and burning sensation in the L5 dermatomal pattern .  Symptoms are worst: worse in the morning, sitting increases, Alleviating factors identifiable by patient are: walking was effective On a scale of 1-10, pain  typically increases to 7/10, currently impacting quality of life      Conservative therapy (PT/chiro/accupuncture): Recent PT in the last 6 months for 6 weeks, and chiro care  Previous treatments: cortisone shot in the right hip  Prior surgeries of the spine: not of spine  Bowel/bladder incontinence or saddle anesthesia: morning incontinence  Relevant imaging: x ray L spine  Anticoagulants: none  Contrast allergy: none    I have personally reviewed and/or updated the patient's past medical history, past surgical history, family history, social history, current medications, allergies, and vital signs today.     Review of Systems   Musculoskeletal:  Positive for arthralgias, joint swelling and myalgias.   All other systems reviewed and are negative.      Patient Active Problem List   Diagnosis   • Right lumbar radiculitis   • Greater trochanteric bursitis of right hip       No past medical history on file.    Past Surgical History:   Procedure Laterality Date   •  SECTION         Family History   Problem Relation Age of Onset   • Ovarian cancer Maternal Grandmother        Social History     Occupational History   • Not on file   Tobacco Use   • Smoking status: Never   • Smokeless tobacco: Never   Substance and Sexual Activity   • Alcohol use: Yes   • Drug use: Not on file   • Sexual activity: Yes     Birth control/protection: Condom Male       Current Outpatient Medications on File Prior to Visit   Medication Sig   • Aspirin-Acetaminophen-Caffeine (EXCEDRIN MIGRAINE PO) Take 1 tablet by mouth daily   • gabapentin (NEURONTIN) 300 mg capsule 1-2 tablets at night for nerve pain   • methocarbamol (ROBAXIN) 500 mg tablet Take 1 tablet (500 mg total) by mouth 3 (three) times a day   • omeprazole (PriLOSEC) 20 mg delayed release capsule    • propranolol (INDERAL) 10 mg tablet    • SUMAtriptan (IMITREX) 50 mg tablet    • amitriptyline (ELAVIL) 50 mg tablet Take 50 mg by mouth daily   • SUMAtriptan (IMITREX) 50 mg  "tablet TAKE 1 TABLET AS NEEDED FOR MIGRAINE-MAY REPEAT AFTER 2 HOURS (MAX 2 PER 24 HOURS)     No current facility-administered medications on file prior to visit.       Allergies   Allergen Reactions   • Latex          Physical Exam    /87   Pulse 79   Ht 5' 4\" (1.626 m)   Wt 73.9 kg (163 lb)   BMI 27.98 kg/m²     Constitutional: normal, well developed, well nourished, alert, in no distress and non-toxic and no overt pain behavior.  Eyes: anicteric  HEENT: grossly intact  Neck: supple, symmetric, trachea midline and no masses   Pulmonary:even and unlabored  Cardiovascular:No edema or pitting edema present  Skin:Normal without rashes or lesions and well hydrated  Psychiatric:Mood and affect appropriate  Neurologic:Cranial Nerves II-XII grossly intact Sensation grossly intact; no clonus negative falcon's.      MSK:      Lumbar Spine:  No masses or atrophy,    Range of motion - Decreased extension/flexion  Palpation - Tenderness to palpation in the lumbar parapsinals   PSIS tenderness none  Joel's/LUIZ none  Gaenslen's non  SLR posittive on right        Strength Right Left   Hip flexion L1,2 5 5   Knee extension L3 5 5   Ankle dorsiflexion L4 5 5   Great toe extension L5 5 5   Ankle Plantarflexion S1 5 5       Sensory Exam:  intact to light touch bilateral LE       Reflexes:     Right Left   Biceps 2+ 2+   Triceps 2+ 2+   Brachioradialis 2+ 2+   Patellar 2+ 3+   Achilles 2+ 2+   Babinski neg neg        Gait normal                   Imaging      Xr Lspine  8/28/23  Mild scoliotic deformity is noted. Grade 1 anterolisthesis of L4 on L5.     Mild intervertebral disc space narrowing at L4-5. Facet arthropathy at L4-5, L5-S1.     The pedicles appear intact. No instability on flexion/extension views.     Soft tissues are unremarkable.     IMPRESSION:     Mild degenerative changes of the lower lumbar spine.      MRI L spine 5/24  IMPRESSION:  Degenerative spondylosis at L4-5 primarily due to facet arthropathy " with moderate canal stenosis and 6 x 4 mm synovial cyst at arising from the right facet complex that effaces the right lateral recess and likely impinges on the traversing right L5 nerve   root. Smaller synovial cyst on the left with mild narrowing of the left lateral recess.

## 2024-05-21 NOTE — TELEPHONE ENCOUNTER
Beatrice from [facility] called requesting MRI report. Advised she not our pt. Beatrice understood.

## 2024-06-10 ENCOUNTER — HOSPITAL ENCOUNTER (OUTPATIENT)
Dept: RADIOLOGY | Facility: HOSPITAL | Age: 49
Discharge: HOME | End: 2024-06-10
Attending: PAIN MEDICINE | Admitting: PAIN MEDICINE
Payer: COMMERCIAL

## 2024-06-10 VITALS
SYSTOLIC BLOOD PRESSURE: 165 MMHG | RESPIRATION RATE: 18 BRPM | DIASTOLIC BLOOD PRESSURE: 94 MMHG | HEART RATE: 75 BPM | TEMPERATURE: 97.5 F | OXYGEN SATURATION: 95 %

## 2024-06-10 DIAGNOSIS — M54.16 LUMBAR RADICULOPATHY: ICD-10-CM

## 2024-06-10 RX ORDER — BUPIVACAINE HCL/PF 2.5 MG/ML
2 VIAL (ML) INJECTION ONCE
Status: COMPLETED | OUTPATIENT
Start: 2024-06-10 | End: 2024-06-10

## 2024-06-10 RX ORDER — LIDOCAINE HYDROCHLORIDE 10 MG/ML
10 INJECTION, SOLUTION EPIDURAL; INFILTRATION; INTRACAUDAL; PERINEURAL ONCE
Status: COMPLETED | OUTPATIENT
Start: 2024-06-10 | End: 2024-06-10

## 2024-06-10 RX ADMIN — IOHEXOL 3 ML: 300 INJECTION, SOLUTION INTRAVENOUS at 10:34

## 2024-06-10 RX ADMIN — Medication 18 MG: at 10:34

## 2024-06-10 RX ADMIN — BUPIVACAINE HYDROCHLORIDE 1 ML: 2.5 INJECTION, SOLUTION EPIDURAL; INFILTRATION; INTRACAUDAL at 10:34

## 2024-06-10 RX ADMIN — LIDOCAINE HYDROCHLORIDE 5 ML: 10 INJECTION, SOLUTION EPIDURAL; INFILTRATION; INTRACAUDAL; PERINEURAL at 10:30

## 2024-06-10 NOTE — DISCHARGE INSTR - LAB
Epidural Steroid Injection   WHAT YOU NEED TO KNOW:   An epidural steroid injection (VIVIANA) is a procedure to inject steroid medicine into the epidural space. The epidural space is between your spinal cord and vertebrae. Steroids reduce inflammation and fluid buildup in your spine that may be causing pain. You may be given pain medicine along with the steroids.          ACTIVITY  Do not drive or operate machinery today.  No strenuous activity today - bending, lifting, etc.  You may resume normal activites starting tomorrow - start slowly and as tolerated.  You may shower today, but no tub baths or hot tubs.  You may have numbness for several hours from the local anesthetic. Please use caution and common sense, especially with weight-bearing activities.    CARE OF THE INJECTION SITE  If you have soreness or pain, apply ice to the area today (20 minutes on/20 minutes off).  Starting tomorrow, you may use warm, moist heat or ice if needed.  You may have an increase or change in your discomfort for 36-48 hours after your treatment.  Apply ice and continue with any pain medication you have been prescribed.  Notify the [facility] if you have any of the following: redness, drainage, swelling, headache, stiff neck or fever above 100°F.    SPECIAL INSTRUCTIONS  Our office will contact you in approximately 14 days for a progress report.    MEDICATIONS  Continue to take all routine medications.  Our office may have instructed you to hold some medications.    As no general anesthesia was used in today's procedure, you should not experience any side effects related to anesthesia.     If you are diabetic, the steroids used in today's injection may temporarily increase your blood sugar levels after the first few days after your injection. Please keep a close eye on your sugars and alert the doctor who manages your diabetes if your sugars are significantly high from your baseline or you are symptomatic.     If you have a  problem specifically related to your procedure, please call our office at [phone].  Problems not related to your procedure should be directed to your primary care physician.

## 2024-06-25 ENCOUNTER — OFFICE VISIT (OUTPATIENT)
Dept: PAIN MEDICINE | Facility: CLINIC | Age: 49
End: 2024-06-25
Payer: COMMERCIAL

## 2024-06-25 VITALS
SYSTOLIC BLOOD PRESSURE: 147 MMHG | DIASTOLIC BLOOD PRESSURE: 90 MMHG | HEART RATE: 77 BPM | BODY MASS INDEX: 27.83 KG/M2 | HEIGHT: 64 IN | WEIGHT: 163 LBS

## 2024-06-25 DIAGNOSIS — M47.816 LUMBAR SPONDYLOSIS: ICD-10-CM

## 2024-06-25 DIAGNOSIS — M54.16 LUMBAR RADICULOPATHY: Primary | ICD-10-CM

## 2024-06-25 PROCEDURE — 99213 OFFICE O/P EST LOW 20 MIN: CPT | Performed by: PAIN MEDICINE

## 2024-06-25 NOTE — PROGRESS NOTES
Assessment  1. Lumbar radiculopathy  2. Lumbar spondylosis        Ms. Rodarte status post right L4-5 L5-S1 transforaminal with excellent relief in her pain symptoms recommend this time she focus on core strengthening recommended pool exercises and pool therapy to help her with her pain symptoms.  Follow-up as needed.    Medications: continue gabapentin 300 1-2 tabs qhs, methocarbomol 500 mg tid, discussed risks/benefits/alternatives regarding any new medications started at todays visit          My impressions and treatment recommendations were discussed in detail with the patient who verbalized understanding and had no further questions.  Discharge instructions were provided. I personally saw and examined the patient and I agree with the above discussed plan of care.    Plan      No orders of the defined types were placed in this encounter.        No orders of the defined types were placed in this encounter.        History of Present Illness  F/u 6/25/24  Urvashi comes for follow-up visit she is status post L4-5 5 S1 transforaminal 16 2024 she reports 100% relief occasional leg pain symptoms on the right side but more or less controlled.  She would like to get back to activities now.  Her pain is currently a 2 out of 10.  She occasionally has low back pain but she wants to focus more on her core strengthening.        F/u 5/21/24  Urvashi is here to review her lumbar spine MRI she continues to have low back and right leg pain in the L5 dermatomal distribution.  She describes as a burning sensation symptoms are worse in the morning and sitting increases the pain alleviate by walking.  Her pain right now is a 7 out of 10.  In terms of the gabapentin it is helping her sleep, methocarbamol she is currently taking that she reports is not effective        Initial Consult  Urvashi Rodarte is a 48 y.o. female with relevant PMH of possible HTN    Presenting to  [facility] for chief complaint of low back pain and  right leg pain,  referred by   Symptoms have been present for several years, increasing in the past couple weeks, pain is always constant and include symptoms were gradual, numbness and tingling in the right leg. Quality of pain: moderate to severe, dull aching in the lo wback and burning sensation in the L5 dermatomal pattern .  Symptoms are worst: worse in the morning, sitting increases, Alleviating factors identifiable by patient are: walking was effective On a scale of 1-10, pain typically increases to 7/10, currently impacting quality of life      Conservative therapy (PT/chiro/accupuncture): Recent PT in the last 6 months for 6 weeks, and chiro care  Previous treatments: cortisone shot in the right hip  Prior surgeries of the spine: not of spine  Bowel/bladder incontinence or saddle anesthesia: morning incontinence  Relevant imaging: x ray L spine  Anticoagulants: none  Contrast allergy: none    I have personally reviewed and/or updated the patient's past medical history, past surgical history, family history, social history, current medications, allergies, and vital signs today.     Review of Systems   Musculoskeletal:  Positive for arthralgias, joint swelling and myalgias.   All other systems reviewed and are negative.      Patient Active Problem List   Diagnosis   • Right lumbar radiculitis   • Greater trochanteric bursitis of right hip       No past medical history on file.    Past Surgical History:   Procedure Laterality Date   •  SECTION         Family History   Problem Relation Age of Onset   • Ovarian cancer Maternal Grandmother        Social History     Occupational History   • Not on file   Tobacco Use   • Smoking status: Never   • Smokeless tobacco: Never   Substance and Sexual Activity   • Alcohol use: Yes   • Drug use: Not on file   • Sexual activity: Yes     Birth control/protection: Condom Male       Current Outpatient Medications on File Prior to Visit   Medication Sig   •  "Aspirin-Acetaminophen-Caffeine (EXCEDRIN MIGRAINE PO) Take 1 tablet by mouth daily   • gabapentin (NEURONTIN) 300 mg capsule 1-2 tablets at night for nerve pain   • meloxicam (MOBIC) 7.5 mg tablet Take 1 tablet (7.5 mg total) by mouth 2 (two) times a day as needed for moderate pain   • methocarbamol (ROBAXIN) 500 mg tablet Take 1 tablet (500 mg total) by mouth 3 (three) times a day   • omeprazole (PriLOSEC) 20 mg delayed release capsule    • SUMAtriptan (IMITREX) 50 mg tablet    • amitriptyline (ELAVIL) 50 mg tablet Take 50 mg by mouth daily   • propranolol (INDERAL) 10 mg tablet    • SUMAtriptan (IMITREX) 50 mg tablet TAKE 1 TABLET AS NEEDED FOR MIGRAINE-MAY REPEAT AFTER 2 HOURS (MAX 2 PER 24 HOURS)     No current facility-administered medications on file prior to visit.       Allergies   Allergen Reactions   • Latex          Physical Exam    /90   Pulse 77   Ht 5' 4\" (1.626 m)   Wt 73.9 kg (163 lb)   BMI 27.98 kg/m²     Constitutional: normal, well developed, well nourished, alert, in no distress and non-toxic and no overt pain behavior.  Eyes: anicteric  HEENT: grossly intact  Neck: supple, symmetric, trachea midline and no masses   Pulmonary:even and unlabored  Cardiovascular:No edema or pitting edema present  Skin:Normal without rashes or lesions and well hydrated  Psychiatric:Mood and affect appropriate  Neurologic:Cranial Nerves II-XII grossly intact Sensation grossly intact; no clonus negative falcon's.      MSK:      Lumbar Spine:  No masses or atrophy,    Range of motion - Decreased extension/flexion  Palpation - Tenderness to palpation in the lumbar parapsinals   PSIS tenderness none  Joel's/LUIZ none  Gaenslen's non  SLR posittive on right        Strength Right Left   Hip flexion L1,2 5 5   Knee extension L3 5 5   Ankle dorsiflexion L4 5 5   Great toe extension L5 5 5   Ankle Plantarflexion S1 5 5       Sensory Exam:  intact to light touch bilateral LE       Reflexes:     Right Left "   Biceps 2+ 2+   Triceps 2+ 2+   Brachioradialis 2+ 2+   Patellar 2+ 3+   Achilles 2+ 2+   Babinski neg neg        Gait normal                   Imaging      Xr Lspine  8/28/23  Mild scoliotic deformity is noted. Grade 1 anterolisthesis of L4 on L5.     Mild intervertebral disc space narrowing at L4-5. Facet arthropathy at L4-5, L5-S1.     The pedicles appear intact. No instability on flexion/extension views.     Soft tissues are unremarkable.     IMPRESSION:     Mild degenerative changes of the lower lumbar spine.      MRI L spine 5/24  IMPRESSION:  Degenerative spondylosis at L4-5 primarily due to facet arthropathy with moderate canal stenosis and 6 x 4 mm synovial cyst at arising from the right facet complex that effaces the right lateral recess and likely impinges on the traversing right L5 nerve   root. Smaller synovial cyst on the left with mild narrowing of the left lateral recess.      Procedure  Right L4-5 L5-S1 TFESI with 100% relief

## 2024-11-12 ENCOUNTER — HOSPITAL ENCOUNTER (OUTPATIENT)
Dept: RADIOLOGY | Facility: MEDICAL CENTER | Age: 49
Discharge: HOME | End: 2024-11-12
Payer: COMMERCIAL

## 2024-11-12 VITALS — WEIGHT: 175 LBS | BODY MASS INDEX: 29.88 KG/M2 | HEIGHT: 64 IN

## 2024-11-12 DIAGNOSIS — Z12.31 ENCOUNTER FOR SCREENING MAMMOGRAM FOR MALIGNANT NEOPLASM OF BREAST: ICD-10-CM

## 2024-11-12 PROCEDURE — 77067 SCR MAMMO BI INCL CAD: CPT

## 2024-11-12 PROCEDURE — 77063 BREAST TOMOSYNTHESIS BI: CPT

## 2025-01-29 ENCOUNTER — TELEPHONE (OUTPATIENT)
Age: 50
End: 2025-01-29

## 2025-01-29 DIAGNOSIS — M51.16 LUMBAR DISC DISEASE WITH RADICULOPATHY: ICD-10-CM

## 2025-01-29 DIAGNOSIS — M54.16 LUMBAR RADICULOPATHY: Primary | ICD-10-CM

## 2025-01-29 RX ORDER — GABAPENTIN 300 MG/1
CAPSULE ORAL
Qty: 90 CAPSULE | Refills: 0 | Status: SHIPPED | OUTPATIENT
Start: 2025-01-29

## 2025-01-29 NOTE — PROGRESS NOTES
Urvashi has increase in the pain in the right leg  She had benefit with right L4-5, L5-s1 TFESI 6/24  We will schedule repeat Right L4-5, L5-s1 TFESI    The risks of the procedure were discussed in detail.  These risks include infection, increased pain, paralysis, bleeding.  Patient understands the risks and is willing to pursue with the procedure

## 2025-01-29 NOTE — TELEPHONE ENCOUNTER
Caller: willam Landin    Doctor: German    Reason for call: pt looking to schedule a repeat injection    Pt will be available until 115 today (she is a )  if you can't reach pt today, she will be available after 9 tomorrow morning    Call back#: [phone]

## 2025-01-29 NOTE — TELEPHONE ENCOUNTER
S/w pt. S/p  R L4-5,L5-S1 TFESI 6/10  Pt reports pain is the same in lower back and through right leg. States at least 65% relief for the first few months, then pain slowly returned. Pain is back to preprocedure level at this time and would like to repeat injection.  Last ov 6/25    Please advise

## 2025-02-17 ENCOUNTER — HOSPITAL ENCOUNTER (OUTPATIENT)
Dept: RADIOLOGY | Facility: HOSPITAL | Age: 50
Discharge: HOME | End: 2025-02-17
Attending: PAIN MEDICINE | Admitting: PAIN MEDICINE
Payer: COMMERCIAL

## 2025-02-17 VITALS
SYSTOLIC BLOOD PRESSURE: 158 MMHG | OXYGEN SATURATION: 98 % | DIASTOLIC BLOOD PRESSURE: 84 MMHG | HEART RATE: 80 BPM | TEMPERATURE: 96.7 F | RESPIRATION RATE: 16 BRPM

## 2025-02-17 DIAGNOSIS — M54.16 LUMBAR RADICULOPATHY: ICD-10-CM

## 2025-02-17 DIAGNOSIS — M51.16 LUMBAR DISC DISEASE WITH RADICULOPATHY: ICD-10-CM

## 2025-02-17 PROCEDURE — 64483 NJX AA&/STRD TFRM EPI L/S 1: CPT | Performed by: PAIN MEDICINE

## 2025-02-17 PROCEDURE — 64484 NJX AA&/STRD TFRM EPI L/S EA: CPT | Performed by: PAIN MEDICINE

## 2025-02-17 RX ORDER — BUPIVACAINE HCL/PF 2.5 MG/ML
2 VIAL (ML) INJECTION ONCE
Status: COMPLETED | OUTPATIENT
Start: 2025-02-17 | End: 2025-02-17

## 2025-02-17 RX ORDER — METHYLPREDNISOLONE ACETATE 80 MG/ML
80 INJECTION, SUSPENSION INTRA-ARTICULAR; INTRALESIONAL; INTRAMUSCULAR; PARENTERAL; SOFT TISSUE ONCE
Status: COMPLETED | OUTPATIENT
Start: 2025-02-17 | End: 2025-02-17

## 2025-02-17 RX ADMIN — IOHEXOL 1 ML: 300 INJECTION, SOLUTION INTRAVENOUS at 13:30

## 2025-02-17 RX ADMIN — METHYLPREDNISOLONE ACETATE 80 MG: 80 INJECTION, SUSPENSION INTRA-ARTICULAR; INTRALESIONAL; INTRAMUSCULAR; SOFT TISSUE at 13:30

## 2025-02-17 RX ADMIN — BUPIVACAINE HYDROCHLORIDE 2 ML: 2.5 INJECTION, SOLUTION EPIDURAL; INFILTRATION; INTRACAUDAL at 13:30

## 2025-02-17 NOTE — H&P
History of Present Illness: The patient is a 49 y.o. female who presents with complaints of low back and right leg pain    No past medical history on file.    Past Surgical History:   Procedure Laterality Date     SECTION           Current Outpatient Medications:     amitriptyline (ELAVIL) 50 mg tablet, Take 50 mg by mouth daily, Disp: , Rfl: 5    Aspirin-Acetaminophen-Caffeine (EXCEDRIN MIGRAINE PO), Take 1 tablet by mouth daily, Disp: , Rfl:     gabapentin (NEURONTIN) 300 mg capsule, 1-2 tablets at night for nerve pain, Disp: 60 capsule, Rfl: 1    gabapentin (NEURONTIN) 300 mg capsule, Take 1 tablet at bedtime for 3 days, then 1 tablet twice daily for 3 days, then 1 tablet 3 times daily, Disp: 90 capsule, Rfl: 0    meloxicam (MOBIC) 7.5 mg tablet, Take 1 tablet (7.5 mg total) by mouth 2 (two) times a day as needed for moderate pain, Disp: 60 tablet, Rfl: 0    methocarbamol (ROBAXIN) 500 mg tablet, Take 1 tablet (500 mg total) by mouth 3 (three) times a day, Disp: 90 tablet, Rfl: 1    omeprazole (PriLOSEC) 20 mg delayed release capsule, , Disp: , Rfl:     propranolol (INDERAL) 10 mg tablet, , Disp: , Rfl:     SUMAtriptan (IMITREX) 50 mg tablet, TAKE 1 TABLET AS NEEDED FOR MIGRAINE-MAY REPEAT AFTER 2 HOURS (MAX 2 PER 24 HOURS), Disp: , Rfl: 5    SUMAtriptan (IMITREX) 50 mg tablet, , Disp: , Rfl:     Allergies   Allergen Reactions    Latex        Physical Exam:   Vitals:    25 1306   BP: 142/72   Pulse: 71   Resp: 16   Temp: (!) 96.7 °F (35.9 °C)   SpO2: 98%     General: Awake, Alert, Oriented x 3, Mood and affect appropriate  Respiratory: Respirations even and unlabored  Cardiovascular: Peripheral pulses intact; no edema  Musculoskeletal Exam: + SLR right    ASA Score: 1    Patient/Chart Verification  Patient ID Verified: Verbal  ID Band Applied: No  H&P( within 30 days) Verified: To be obtained in the Procedural area  Allergies Reviewed: Yes  Anticoag/NSAID held?: NA  Currently on antibiotics?:  No    Assessment:   1. Lumbar radiculopathy    2. Lumbar disc disease with radiculopathy        Plan: Right L4-5, L5-s1 TFESI      The risks of the procedure were discussed in detail.  These risks include infection, increased pain, paralysis, bleeding.  Patient understands the risks and is willing to pursue with the procedure

## 2025-02-17 NOTE — DISCHARGE INSTR - LAB
Epidural Steroid Injection   WHAT YOU NEED TO KNOW:   An epidural steroid injection (VIVIANA) is a procedure to inject steroid medicine into the epidural space. The epidural space is between your spinal cord and vertebrae. Steroids reduce inflammation and fluid buildup in your spine that may be causing pain. You may be given pain medicine along with the steroids.          ACTIVITY  Do not drive or operate machinery today.  No strenuous activity today - bending, lifting, etc.  You may resume normal activites starting tomorrow - start slowly and as tolerated.  You may shower today, but no tub baths or hot tubs.  You may have numbness for several hours from the local anesthetic. Please use caution and common sense, especially with weight-bearing activities.    CARE OF THE INJECTION SITE  If you have soreness or pain, apply ice to the area today (20 minutes on/20 minutes off).  Starting tomorrow, you may use warm, moist heat or ice if needed.  You may have an increase or change in your discomfort for 36-48 hours after your treatment.  Apply ice and continue with any pain medication you have been prescribed.  Notify the [facility] if you have any of the following: redness, drainage, swelling, headache, stiff neck or fever above 100°F.    SPECIAL INSTRUCTIONS  Our office will contact you in approximately 14 days for a progress report.    MEDICATIONS  Continue to take all routine medications.  Our office may have instructed you to hold some medications.    As no general anesthesia was used in today's procedure, you should not experience any side effects related to anesthesia.     If you are diabetic, the steroids used in today's injection may temporarily increase your blood sugar levels after the first few days after your injection. Please keep a close eye on your sugars and alert the doctor who manages your diabetes if your sugars are significantly high from your baseline or you are symptomatic.     If you have a  problem specifically related to your procedure, please call our office at [phone].  Problems not related to your procedure should be directed to your primary care physician.

## 2025-03-03 ENCOUNTER — TELEPHONE (OUTPATIENT)
Dept: PAIN MEDICINE | Facility: CLINIC | Age: 50
End: 2025-03-03

## 2025-05-01 ENCOUNTER — APPOINTMENT (OUTPATIENT)
Dept: URGENT CARE | Facility: MEDICAL CENTER | Age: 50
End: 2025-05-01

## 2025-06-19 ENCOUNTER — TELEPHONE (OUTPATIENT)
Age: 50
End: 2025-06-19

## 2025-06-19 ENCOUNTER — OFFICE VISIT (OUTPATIENT)
Dept: OBGYN CLINIC | Facility: CLINIC | Age: 50
End: 2025-06-19
Payer: COMMERCIAL

## 2025-06-19 ENCOUNTER — PREP FOR PROCEDURE (OUTPATIENT)
Age: 50
End: 2025-06-19

## 2025-06-19 VITALS
HEIGHT: 64 IN | DIASTOLIC BLOOD PRESSURE: 82 MMHG | BODY MASS INDEX: 30.05 KG/M2 | WEIGHT: 176 LBS | SYSTOLIC BLOOD PRESSURE: 132 MMHG

## 2025-06-19 DIAGNOSIS — Z12.12 SCREENING FOR COLORECTAL CANCER: ICD-10-CM

## 2025-06-19 DIAGNOSIS — Z12.31 ENCOUNTER FOR SCREENING MAMMOGRAM FOR MALIGNANT NEOPLASM OF BREAST: ICD-10-CM

## 2025-06-19 DIAGNOSIS — Z12.4 SCREENING FOR MALIGNANT NEOPLASM OF THE CERVIX: ICD-10-CM

## 2025-06-19 DIAGNOSIS — Z01.419 ROUTINE GYNECOLOGICAL EXAMINATION: Primary | ICD-10-CM

## 2025-06-19 DIAGNOSIS — Z12.11 SCREENING FOR COLORECTAL CANCER: ICD-10-CM

## 2025-06-19 DIAGNOSIS — N95.1 PERIMENOPAUSAL SYMPTOM: ICD-10-CM

## 2025-06-19 DIAGNOSIS — Z11.51 SCREENING FOR HPV (HUMAN PAPILLOMAVIRUS): ICD-10-CM

## 2025-06-19 DIAGNOSIS — Z12.11 SCREENING FOR COLON CANCER: Primary | ICD-10-CM

## 2025-06-19 PROCEDURE — G0476 HPV COMBO ASSAY CA SCREEN: HCPCS

## 2025-06-19 PROCEDURE — S0610 ANNUAL GYNECOLOGICAL EXAMINA: HCPCS

## 2025-06-19 PROCEDURE — G0145 SCR C/V CYTO,THINLAYER,RESCR: HCPCS

## 2025-06-19 RX ORDER — PROPRANOLOL HYDROCHLORIDE 60 MG/1
60 TABLET ORAL DAILY
COMMUNITY

## 2025-06-19 RX ORDER — IBUPROFEN 200 MG
TABLET ORAL
COMMUNITY

## 2025-06-19 RX ORDER — NORETHINDRONE ACETATE AND ETHINYL ESTRADIOL 1MG-20(21)
1 KIT ORAL DAILY
Qty: 84 TABLET | Refills: 1 | Status: SHIPPED | OUTPATIENT
Start: 2025-06-19

## 2025-06-19 NOTE — PROGRESS NOTES
ASSESSMENT & PLAN:   - We discussed perimenopause/menopause today including symptoms and multiple options for management. We discussed OCP vs HRT, vitamins/supplements like black cohosh, veozah, phytoestrogens.  - Patient to begin Junel 1/20. She understands to take one pill at the same time each day. We discussed use, risks, benefits, side effects of this medication today. She understands to continue using condoms for STD prevention and especially during the first month of pill use for pregnancy prevention. Patient will f/u in the office in 3 months for pill check.      Diagnoses and all orders for this visit:    Routine gynecological examination  -     Liquid-based pap, screening    Encounter for screening mammogram for malignant neoplasm of breast  -     Mammo screening bilateral w 3d and cad; Future    Screening for malignant neoplasm of the cervix  -     Liquid-based pap, screening    Screening for HPV (human papillomavirus)  -     Liquid-based pap, screening    Screening for colorectal cancer  -     Ambulatory Referral to Gastroenterology; Future    Perimenopausal symptom  -     norethindrone-ethinyl estradiol (Junel FE 1/20) 1-20 MG-MCG per tablet; Take 1 tablet by mouth daily    Other orders  -     ibuprofen (MOTRIN) 200 mg tablet; Take by mouth  -     propranolol (INDERAL) 60 mg tablet; Take 60 mg by mouth in the morning    The following were reviewed in today's visit: ASCCP guidelines, Gardisil vaccination, STD testing breast self exam, mammography screening ordered, STD testing, use and side effects of OCPs, exercise, and healthy diet.    Patient to return to office in yearly for annual exam.     All questions have been answered to her satisfaction.    CC:  Annual Gynecologic Examination  Chief Complaint   Patient presents with    Gynecologic Exam     The patient is here for a yearly exam.   The patient has irregular periods. She has gone four months without having a period or she has had a period for 20  "days. Her periods have been irregular for over a year.   No heavy bleeding or bad cramping periods.   No vaginal, bowel, bladder or breast problems.   The patient hot flashes or night sweats daily.      HPI: Urvashi Rodarte is a 49 y.o.  who presents for annual gynecologic examination. She is a new patient to our office. She has the following concerns:  Daily hot flashes/night sweats - seem to occur at 3AM every day. Last summer, she was \"miserable\" and had daily hot flashes especially in the heat. She c/o irregular periods for 1 yr. Her periods alter between not coming for months and then lasting for ~20 days. She will often go 1-4 months between menses.  She denies hx of blood clots in legs/lungs, migraine with aura, cigarette use.    Health Maintenance:    Exercise: intermittently  Breast exams/breast awareness: yes  Last mammogram: 2024, BIRADS1  Colorectal cancer screening: N/A    Past Medical History[1]    Past Surgical History[2]    Past OB/Gyn History:   Patient's last menstrual period was 2025 (approximate).    Menopausal status: perimenopausal  Menopausal symptoms: irregular menses, hot flashes/night sweats    Last Pap: long time ago: no abnormalities  History of abnormal Pap smear: no    Patient is not currently sexually active.   STD testing: no  Current contraception: none    Family History  Family History[3]    Family history of uterine or ovarian cancer: yes, ovarian - MGM  Family history of breast cancer: no  Family history of colon cancer: no    Social History:  Social History     Socioeconomic History    Marital status: /Civil Union     Spouse name: Not on file    Number of children: Not on file    Years of education: Not on file    Highest education level: Not on file   Occupational History    Not on file   Tobacco Use    Smoking status: Never    Smokeless tobacco: Never   Substance and Sexual Activity    Alcohol use: Yes     Alcohol/week: 4.0 standard drinks of alcohol     " "Types: 2 Glasses of wine, 2 Cans of beer per week    Drug use: Never    Sexual activity: Not Currently     Partners: Male     Birth control/protection: Condom Male   Other Topics Concern    Not on file   Social History Narrative    Not on file     Social Drivers of Health     Financial Resource Strain: Not on file   Food Insecurity: Not on file   Transportation Needs: Not on file   Physical Activity: Not on file   Stress: Not on file   Social Connections: Not on file   Intimate Partner Violence: Not on file   Housing Stability: Not on file     Domestic violence screen: negative    Allergies:  Allergies[4]    Medications:  Current Medications[5]    Review of Systems:  Review of Systems   Constitutional:  Negative for chills and fever.   Respiratory:  Negative for shortness of breath.    Cardiovascular:  Negative for chest pain.   Gastrointestinal:  Negative for abdominal pain, constipation and diarrhea.   Endocrine: Positive for heat intolerance.   Genitourinary:  Positive for menstrual problem. Negative for dysuria, frequency, pelvic pain, vaginal discharge and vaginal pain.   Psychiatric/Behavioral:  Negative for self-injury and suicidal ideas.      Physical Exam:  /82   Ht 5' 4\" (1.626 m)   Wt 79.8 kg (176 lb)   LMP 05/30/2025 (Approximate)   BMI 30.21 kg/m²    Physical Exam  Constitutional:       Appearance: Normal appearance.   Genitourinary:      Vulva and urethral meatus normal.      No labial fusion noted.      No vaginal erythema or tenderness.        Right Adnexa: not tender.     Left Adnexa: not tender.     No cervical discharge or friability.      Uterus is not tender.   Breasts:     Breasts are symmetrical.      Right: Normal.      Left: Normal.   HENT:      Head: Normocephalic and atraumatic.   Neck:      Thyroid: No thyroid mass or thyroid tenderness.     Cardiovascular:      Rate and Rhythm: Normal rate and regular rhythm.      Heart sounds: Normal heart sounds. No murmur heard.  Pulmonary: "      Effort: Pulmonary effort is normal.      Breath sounds: Normal breath sounds. No wheezing.   Abdominal:      Palpations: Abdomen is soft. There is no mass.      Tenderness: There is no abdominal tenderness.   Lymphadenopathy:      Cervical: No cervical adenopathy.     Neurological:      General: No focal deficit present.      Mental Status: She is alert and oriented to person, place, and time.     Skin:     General: Skin is warm and dry.     Psychiatric:         Mood and Affect: Mood normal.         Behavior: Behavior normal.   Vitals and nursing note reviewed.                                     [1]   Past Medical History:  Diagnosis Date    GERD (gastroesophageal reflux disease)     Headache(784.0)     Hypertension     Migraine     Varicella    [2]   Past Surgical History:  Procedure Laterality Date     SECTION      EPIDURAL BLOCK INJECTION     [3]   Family History  Problem Relation Name Age of Onset    Migraines Mother Octavia Constable     Skin cancer Father      Ovarian cancer Maternal Grandmother Temitope Houghton     Cancer Maternal Grandmother Temitope Houghton     No Known Problems Maternal Grandfather      No Known Problems Paternal Grandmother      No Known Problems Paternal Grandfather      No Known Problems Son      No Known Problems Son     [4]   Allergies  Allergen Reactions    Latex    [5]   Current Outpatient Medications:     Aspirin-Acetaminophen-Caffeine (EXCEDRIN MIGRAINE PO), Take 1 tablet by mouth in the morning., Disp: , Rfl:     ibuprofen (MOTRIN) 200 mg tablet, Take by mouth, Disp: , Rfl:     norethindrone-ethinyl estradiol (Junel FE 1/20) 1-20 MG-MCG per tablet, Take 1 tablet by mouth daily, Disp: 84 tablet, Rfl: 1    omeprazole (PriLOSEC) 20 mg delayed release capsule, , Disp: , Rfl:     propranolol (INDERAL) 60 mg tablet, Take 60 mg by mouth in the morning, Disp: , Rfl:     SUMAtriptan (IMITREX) 50 mg tablet, , Disp: , Rfl:

## 2025-06-19 NOTE — TELEPHONE ENCOUNTER
Scheduled date of colonoscopy (as of today):8/7/25    Physician performing colonoscopy:Dr Maier    Location of colonoscopy:[facility]    Bowel prep reviewed with patient:miralax/dulcolax prep instructions    Instructions reviewed with patient by:prep instructions sent via email on chart    Clearances: N/A

## 2025-06-19 NOTE — TELEPHONE ENCOUNTER
25  Screened by: Tala Ortiz    Referring Provider Libra Young    Pre- Screenin' 4 176 BMI 30.21    Has patient been referred for a routine screening Colonoscopy? yes  Is the patient between 45-75 years old? yes      Previous Colonoscopy no   If yes:    Date:     Facility:     Reason:         Does the patient want to see a Gastroenterologist prior to their procedure OR are they having any GI symptoms? no    Has the patient been hospitalized or had abdominal surgery in the past 6 months? no    Does the patient use supplemental oxygen? no    Does the patient take Coumadin, Lovenox, Plavix, Elliquis, Xarelto, or other blood thinning medication? no    Has the patient had a stroke, cardiac event, or stent placed in the past year? no    If patient is between 45yrs - 49yrs, please advise patient that we will have to confirm benefits & coverage with their insurance company for a routine screening colonoscopy.

## 2025-06-23 LAB
HPV HR 12 DNA CVX QL NAA+PROBE: NEGATIVE
HPV16 DNA CVX QL NAA+PROBE: NEGATIVE
HPV18 DNA CVX QL NAA+PROBE: NEGATIVE

## 2025-06-25 LAB
LAB AP GYN PRIMARY INTERPRETATION: NORMAL
Lab: NORMAL

## 2025-07-14 ENCOUNTER — PATIENT MESSAGE (OUTPATIENT)
Dept: GASTROENTEROLOGY | Facility: CLINIC | Age: 50
End: 2025-07-14

## 2025-07-24 ENCOUNTER — ANESTHESIA (OUTPATIENT)
Dept: ANESTHESIOLOGY | Facility: HOSPITAL | Age: 50
End: 2025-07-24

## 2025-07-24 ENCOUNTER — ANESTHESIA EVENT (OUTPATIENT)
Dept: ANESTHESIOLOGY | Facility: HOSPITAL | Age: 50
End: 2025-07-24

## 2025-07-24 ENCOUNTER — TELEPHONE (OUTPATIENT)
Age: 50
End: 2025-07-24

## 2025-07-24 NOTE — TELEPHONE ENCOUNTER
Pt called to check if she need to hold on to control pills having iron in it so after advised from the nurse advised pt to hold on to any meds having iron she can stop taking after coming Thursday. Said ok

## 2025-08-05 ENCOUNTER — APPOINTMENT (OUTPATIENT)
Dept: URGENT CARE | Facility: MEDICAL CENTER | Age: 50
End: 2025-08-05

## 2025-08-07 ENCOUNTER — HOSPITAL ENCOUNTER (OUTPATIENT)
Dept: GASTROENTEROLOGY | Facility: AMBULARY SURGERY CENTER | Age: 50
Setting detail: OUTPATIENT SURGERY
Discharge: HOME | End: 2025-08-07
Attending: INTERNAL MEDICINE
Payer: COMMERCIAL

## 2025-08-07 ENCOUNTER — ANESTHESIA EVENT (OUTPATIENT)
Dept: GASTROENTEROLOGY | Facility: AMBULARY SURGERY CENTER | Age: 50
End: 2025-08-07
Payer: COMMERCIAL

## 2025-08-07 PROBLEM — R61 GENERALIZED HYPERHIDROSIS: Status: ACTIVE | Noted: 2022-08-21

## 2025-08-07 RX ORDER — PROPOFOL 10 MG/ML
INJECTION, EMULSION INTRAVENOUS AS NEEDED
Status: DISCONTINUED | OUTPATIENT
Start: 2025-08-07 | End: 2025-08-07

## 2025-08-07 RX ORDER — SODIUM CHLORIDE, SODIUM LACTATE, POTASSIUM CHLORIDE, CALCIUM CHLORIDE 600; 310; 30; 20 MG/100ML; MG/100ML; MG/100ML; MG/100ML
INJECTION, SOLUTION INTRAVENOUS CONTINUOUS PRN
Status: DISCONTINUED | OUTPATIENT
Start: 2025-08-07 | End: 2025-08-07

## 2025-08-07 RX ORDER — LIDOCAINE HYDROCHLORIDE 10 MG/ML
INJECTION, SOLUTION EPIDURAL; INFILTRATION; INTRACAUDAL; PERINEURAL AS NEEDED
Status: DISCONTINUED | OUTPATIENT
Start: 2025-08-07 | End: 2025-08-07

## 2025-08-07 RX ADMIN — SODIUM CHLORIDE, SODIUM LACTATE, POTASSIUM CHLORIDE, AND CALCIUM CHLORIDE: .6; .31; .03; .02 INJECTION, SOLUTION INTRAVENOUS at 10:52

## 2025-08-07 RX ADMIN — PROPOFOL 50 MG: 10 INJECTION, EMULSION INTRAVENOUS at 10:58

## 2025-08-07 RX ADMIN — PROPOFOL 50 MG: 10 INJECTION, EMULSION INTRAVENOUS at 11:00

## 2025-08-07 RX ADMIN — PROPOFOL 50 MG: 10 INJECTION, EMULSION INTRAVENOUS at 11:10

## 2025-08-07 RX ADMIN — LIDOCAINE HYDROCHLORIDE 50 MG: 10 INJECTION, SOLUTION EPIDURAL; INFILTRATION; INTRACAUDAL at 10:55

## 2025-08-07 RX ADMIN — PROPOFOL 50 MG: 10 INJECTION, EMULSION INTRAVENOUS at 11:04

## 2025-08-07 RX ADMIN — PROPOFOL 120 MG: 10 INJECTION, EMULSION INTRAVENOUS at 10:55
